# Patient Record
Sex: FEMALE | Race: WHITE | NOT HISPANIC OR LATINO | URBAN - METROPOLITAN AREA
[De-identification: names, ages, dates, MRNs, and addresses within clinical notes are randomized per-mention and may not be internally consistent; named-entity substitution may affect disease eponyms.]

---

## 2023-02-02 ENCOUNTER — APPOINTMENT (RX ONLY)
Dept: URBAN - METROPOLITAN AREA CLINIC 312 | Facility: CLINIC | Age: 38
Setting detail: DERMATOLOGY
End: 2023-02-02

## 2023-02-02 DIAGNOSIS — L85.3 XEROSIS CUTIS: ICD-10-CM

## 2023-02-02 PROCEDURE — 99202 OFFICE O/P NEW SF 15 MIN: CPT

## 2023-02-02 PROCEDURE — ? COUNSELING

## 2023-02-02 ASSESSMENT — LOCATION DETAILED DESCRIPTION DERM
LOCATION DETAILED: RIGHT RADIAL DORSAL HAND
LOCATION DETAILED: LEFT RADIAL DORSAL HAND

## 2023-02-02 ASSESSMENT — LOCATION ZONE DERM: LOCATION ZONE: HAND

## 2023-02-02 ASSESSMENT — LOCATION SIMPLE DESCRIPTION DERM
LOCATION SIMPLE: LEFT HAND
LOCATION SIMPLE: RIGHT HAND

## 2023-02-02 NOTE — HPI: BODY LOCATION - HAND
How Severe Is Your Condition?: moderate
Additional History: Patient is here for yellowing of hands and feet.  She saw her internist several weeks ago.  Labs were run and came back normal levels.  She has since discontinued her intake of vitamin D and B5 as well as cera ve healing ointment.  Patient states she has seen improvement in color.  Due to residency location also wants to get established with a dermatologist here in [place].

## 2023-02-02 NOTE — PROCEDURE: COUNSELING
Patient Specific Counseling (Will Not Stick From Patient To Patient): NO VISIBLE DISCOLORATION OF THE HANDS OR FEET.  MINIMAL XEROSIS OF THE HANDS.  RECOMMENDED GENTLE SKIN CARE.  UNABLE TO DETERMINE ETIOLOGY OF YELLOWISH DISCOLORATION AS CONDITION WAS NOT PRESENT ON EXAM.  DOES SEEM TO BE A CORELATION B/W D/C B COMPLEX VITAMINE AND VIT D (WHICH PER PT WERE \"GENARO HIGH\" ON PRIOR LABWORK)
Detail Level: Simple

## 2023-02-09 ENCOUNTER — APPOINTMENT (RX ONLY)
Dept: URBAN - METROPOLITAN AREA CLINIC 308 | Facility: CLINIC | Age: 38
Setting detail: DERMATOLOGY
End: 2023-02-09

## 2023-02-09 DIAGNOSIS — L73.2 HIDRADENITIS SUPPURATIVA: ICD-10-CM

## 2023-02-09 DIAGNOSIS — L40.0 PSORIASIS VULGARIS: ICD-10-CM

## 2023-02-09 PROCEDURE — 99214 OFFICE O/P EST MOD 30 MIN: CPT

## 2023-02-09 PROCEDURE — ? COUNSELING

## 2023-02-09 PROCEDURE — ? PRESCRIPTION MEDICATION MANAGEMENT

## 2023-02-09 ASSESSMENT — LOCATION ZONE DERM: LOCATION ZONE: FACE

## 2023-02-09 ASSESSMENT — LOCATION SIMPLE DESCRIPTION DERM
LOCATION SIMPLE: RIGHT FOREHEAD
LOCATION SIMPLE: LEFT EYEBROW

## 2023-02-09 ASSESSMENT — LOCATION DETAILED DESCRIPTION DERM
LOCATION DETAILED: RIGHT INFERIOR FOREHEAD
LOCATION DETAILED: LEFT CENTRAL EYEBROW

## 2023-02-09 NOTE — HPI: RASH (PSORIASIS)
Is This A New Presentation, Or A Follow-Up?: Psoriasis
Additional History: Patient states she was diagnosed with plaque and inverse psoriasis in 2000 and has tried various topical steroids including triamcinolone. She was also diagnosed psoriatic psoriasis and was prescribed Enbrel from her rheumatologist. She states she is currently using hydrocortisone and started Enbrel about a year ago. She states she had an infection in December and had to stop Enbrel but since then has done four injections. Patient also states she was diagnosed with hidradenitis suppurativa in about 2016 and manages her flares with hibiclens. Patient is hoping to establish with a new dermatologist at [facility].

## 2023-02-09 NOTE — PROCEDURE: PRESCRIPTION MEDICATION MANAGEMENT
Continue Regimen: Continue Enbrel that is prescribed from rheumatology.
Render In Strict Bullet Format?: No
Plan: Patient can call office for hydrocortisone refills.
Detail Level: Zone
Plan: Clear today. Pt says she hasn’t had a flare in a long time.
Continue Regimen: Hibbeclens daily as needed